# Patient Record
Sex: MALE | Race: WHITE | Employment: FULL TIME | URBAN - METROPOLITAN AREA
[De-identification: names, ages, dates, MRNs, and addresses within clinical notes are randomized per-mention and may not be internally consistent; named-entity substitution may affect disease eponyms.]

---

## 2017-01-02 ENCOUNTER — HOSPITAL ENCOUNTER (INPATIENT)
Facility: HOSPITAL | Age: 47
LOS: 2 days | Discharge: HOME | DRG: 419 | End: 2017-01-04
Attending: SURGERY | Admitting: SURGERY
Payer: COMMERCIAL

## 2017-01-02 ENCOUNTER — ANESTHESIA EVENT (OUTPATIENT)
Dept: SURGERY | Facility: HOSPITAL | Age: 47
DRG: 419 | End: 2017-01-02
Payer: COMMERCIAL

## 2017-01-02 PROBLEM — K80.00 CHOLECYSTITIS, ACUTE WITH CHOLELITHIASIS: Status: ACTIVE | Noted: 2017-01-02

## 2017-01-02 RX ORDER — TEMAZEPAM 15 MG/1
15 CAPSULE ORAL NIGHTLY PRN
Status: DISCONTINUED | OUTPATIENT
Start: 2017-01-02 | End: 2017-01-04

## 2017-01-02 RX ORDER — HEPARIN SODIUM 5000 [USP'U]/ML
5000 INJECTION, SOLUTION INTRAVENOUS; SUBCUTANEOUS
Status: COMPLETED | OUTPATIENT
Start: 2017-01-03 | End: 2017-01-03

## 2017-01-02 RX ORDER — SODIUM CHLORIDE, SODIUM LACTATE, POTASSIUM CHLORIDE, CALCIUM CHLORIDE 600; 310; 30; 20 MG/100ML; MG/100ML; MG/100ML; MG/100ML
INJECTION, SOLUTION INTRAVENOUS CONTINUOUS
Status: DISCONTINUED | OUTPATIENT
Start: 2017-01-02 | End: 2017-01-03

## 2017-01-02 RX ORDER — HYDROMORPHONE HYDROCHLORIDE 1 MG/ML
INJECTION, SOLUTION INTRAMUSCULAR; INTRAVENOUS; SUBCUTANEOUS
Status: COMPLETED
Start: 2017-01-02 | End: 2017-01-02

## 2017-01-02 RX ORDER — HYDROMORPHONE HYDROCHLORIDE 1 MG/ML
0.5 INJECTION, SOLUTION INTRAMUSCULAR; INTRAVENOUS; SUBCUTANEOUS EVERY 30 MIN PRN
Status: DISCONTINUED | OUTPATIENT
Start: 2017-01-02 | End: 2017-01-04

## 2017-01-02 RX ORDER — HEPARIN SODIUM 5000 [USP'U]/ML
5000 INJECTION, SOLUTION INTRAVENOUS; SUBCUTANEOUS ONCE
Status: DISCONTINUED | OUTPATIENT
Start: 2017-01-03 | End: 2017-01-02

## 2017-01-02 RX ORDER — METOCLOPRAMIDE HYDROCHLORIDE 5 MG/ML
10 INJECTION INTRAMUSCULAR; INTRAVENOUS EVERY 8 HOURS PRN
Status: DISCONTINUED | OUTPATIENT
Start: 2017-01-02 | End: 2017-01-04

## 2017-01-02 RX ORDER — HYDROMORPHONE HYDROCHLORIDE 1 MG/ML
0.3 INJECTION, SOLUTION INTRAMUSCULAR; INTRAVENOUS; SUBCUTANEOUS EVERY 30 MIN PRN
Status: DISCONTINUED | OUTPATIENT
Start: 2017-01-02 | End: 2017-01-04

## 2017-01-02 RX ORDER — HYDROCODONE BITARTRATE AND ACETAMINOPHEN 5; 325 MG/1; MG/1
1 TABLET ORAL EVERY 4 HOURS PRN
Status: DISCONTINUED | OUTPATIENT
Start: 2017-01-02 | End: 2017-01-02

## 2017-01-02 RX ORDER — HEPARIN SODIUM 5000 [USP'U]/ML
5000 INJECTION, SOLUTION INTRAVENOUS; SUBCUTANEOUS ONCE
Status: DISCONTINUED | OUTPATIENT
Start: 2017-01-02 | End: 2017-01-02

## 2017-01-02 RX ORDER — ACETAMINOPHEN 10 MG/ML
1000 INJECTION, SOLUTION INTRAVENOUS EVERY 8 HOURS PRN
Status: DISCONTINUED | OUTPATIENT
Start: 2017-01-02 | End: 2017-01-03

## 2017-01-02 RX ORDER — HYDROCODONE BITARTRATE AND ACETAMINOPHEN 5; 325 MG/1; MG/1
2 TABLET ORAL EVERY 4 HOURS PRN
Status: DISCONTINUED | OUTPATIENT
Start: 2017-01-02 | End: 2017-01-02

## 2017-01-02 RX ORDER — ONDANSETRON 2 MG/ML
4 INJECTION INTRAMUSCULAR; INTRAVENOUS EVERY 6 HOURS PRN
Status: DISCONTINUED | OUTPATIENT
Start: 2017-01-02 | End: 2017-01-04

## 2017-01-02 NOTE — OR NURSING
Sylvester Gottlieb states he was told to come in today to be admitted, thinks he is having surgery today as well. Spoke to the nursing supervisor who confirmed he is being admitted today. She is unsure when his surgery is to be done.

## 2017-01-02 NOTE — PROGRESS NOTES
[facility] Pharmacy Note:  Renal Adjustment for Unasyn (ampicillin/sulbactam)    Anne Jones is a 40year old male who has been prescribed Unasyn (ampicillin/sulbactam) 1.5 gm IVPB every 6 hrs.   Estimated CrCl is 87 ml/min based on IBW 75.3 kg and 12/31/16 SC

## 2017-01-03 ENCOUNTER — APPOINTMENT (OUTPATIENT)
Dept: GENERAL RADIOLOGY | Facility: HOSPITAL | Age: 47
DRG: 419 | End: 2017-01-03
Attending: SURGERY
Payer: COMMERCIAL

## 2017-01-03 ENCOUNTER — SURGERY (OUTPATIENT)
Age: 47
End: 2017-01-03

## 2017-01-03 ENCOUNTER — ANESTHESIA (OUTPATIENT)
Dept: SURGERY | Facility: HOSPITAL | Age: 47
DRG: 419 | End: 2017-01-03
Payer: COMMERCIAL

## 2017-01-03 LAB
ALBUMIN SERPL-MCNC: 2.9 G/DL (ref 3.5–4.8)
ALP LIVER SERPL-CCNC: 117 U/L (ref 45–117)
ALT SERPL-CCNC: 53 U/L (ref 17–63)
AST SERPL-CCNC: 24 U/L (ref 15–41)
BASOPHILS # BLD AUTO: 0.01 X10(3) UL (ref 0–0.1)
BASOPHILS NFR BLD AUTO: 0.1 %
BILIRUB SERPL-MCNC: 1.5 MG/DL (ref 0.1–2)
BUN BLD-MCNC: 10 MG/DL (ref 8–20)
CALCIUM BLD-MCNC: 8.3 MG/DL (ref 8.3–10.3)
CHLORIDE: 104 MMOL/L (ref 101–111)
CO2: 26 MMOL/L (ref 22–32)
CREAT BLD-MCNC: 1.11 MG/DL (ref 0.7–1.3)
EOSINOPHIL # BLD AUTO: 0 X10(3) UL (ref 0–0.3)
EOSINOPHIL NFR BLD AUTO: 0 %
ERYTHROCYTE [DISTWIDTH] IN BLOOD BY AUTOMATED COUNT: 13 % (ref 11.5–16)
GLUCOSE BLD-MCNC: 93 MG/DL (ref 70–99)
HCT VFR BLD AUTO: 40.7 % (ref 37–53)
HGB BLD-MCNC: 13.5 G/DL (ref 13–17)
IMMATURE GRANULOCYTE COUNT: 0.16 X10(3) UL (ref 0–1)
IMMATURE GRANULOCYTE RATIO %: 0.8 %
LYMPHOCYTES # BLD AUTO: 0.88 X10(3) UL (ref 0.9–4)
LYMPHOCYTES NFR BLD AUTO: 4.6 %
M PROTEIN MFR SERPL ELPH: 7 G/DL (ref 6.1–8.3)
MCH RBC QN AUTO: 30 PG (ref 27–33.2)
MCHC RBC AUTO-ENTMCNC: 33.2 G/DL (ref 31–37)
MCV RBC AUTO: 90.4 FL (ref 80–99)
MONOCYTES # BLD AUTO: 1.09 X10(3) UL (ref 0.1–0.6)
MONOCYTES NFR BLD AUTO: 5.7 %
NEUTROPHIL ABS PRELIM: 16.87 X10 (3) UL (ref 1.3–6.7)
NEUTROPHILS # BLD AUTO: 16.87 X10(3) UL (ref 1.3–6.7)
NEUTROPHILS NFR BLD AUTO: 88.8 %
PLATELET # BLD AUTO: 208 10(3)UL (ref 150–450)
POTASSIUM SERPL-SCNC: 3.6 MMOL/L (ref 3.6–5.1)
RBC # BLD AUTO: 4.5 X10(6)UL (ref 4.3–5.7)
RED CELL DISTRIBUTION WIDTH-SD: 43 FL (ref 35.1–46.3)
SODIUM SERPL-SCNC: 137 MMOL/L (ref 136–144)
WBC # BLD AUTO: 19 X10(3) UL (ref 4–13)

## 2017-01-03 PROCEDURE — 74300 X-RAY BILE DUCTS/PANCREAS: CPT

## 2017-01-03 PROCEDURE — 99223 1ST HOSP IP/OBS HIGH 75: CPT | Performed by: SURGERY

## 2017-01-03 PROCEDURE — BF101ZZ FLUOROSCOPY OF BILE DUCTS USING LOW OSMOLAR CONTRAST: ICD-10-PCS | Performed by: SURGERY

## 2017-01-03 PROCEDURE — 0FT44ZZ RESECTION OF GALLBLADDER, PERCUTANEOUS ENDOSCOPIC APPROACH: ICD-10-PCS | Performed by: SURGERY

## 2017-01-03 PROCEDURE — 47563 LAPARO CHOLECYSTECTOMY/GRAPH: CPT | Performed by: SURGERY

## 2017-01-03 DEVICE — SURGIFLO HEMOSTATIC MATRIX KIT: Type: IMPLANTABLE DEVICE | Site: ABDOMEN | Status: FUNCTIONAL

## 2017-01-03 RX ORDER — IBUPROFEN 600 MG/1
600 TABLET ORAL EVERY 6 HOURS PRN
Status: DISCONTINUED | OUTPATIENT
Start: 2017-01-03 | End: 2017-01-04

## 2017-01-03 RX ORDER — DEXAMETHASONE SODIUM PHOSPHATE 4 MG/ML
4 VIAL (ML) INJECTION AS NEEDED
Status: DISCONTINUED | OUTPATIENT
Start: 2017-01-03 | End: 2017-01-03 | Stop reason: HOSPADM

## 2017-01-03 RX ORDER — SODIUM CHLORIDE, SODIUM LACTATE, POTASSIUM CHLORIDE, CALCIUM CHLORIDE 600; 310; 30; 20 MG/100ML; MG/100ML; MG/100ML; MG/100ML
INJECTION, SOLUTION INTRAVENOUS CONTINUOUS
Status: DISCONTINUED | OUTPATIENT
Start: 2017-01-03 | End: 2017-01-04

## 2017-01-03 RX ORDER — ONDANSETRON 2 MG/ML
4 INJECTION INTRAMUSCULAR; INTRAVENOUS AS NEEDED
Status: DISCONTINUED | OUTPATIENT
Start: 2017-01-03 | End: 2017-01-03 | Stop reason: HOSPADM

## 2017-01-03 RX ORDER — HYDROCODONE BITARTRATE AND ACETAMINOPHEN 5; 325 MG/1; MG/1
1 TABLET ORAL EVERY 4 HOURS PRN
Status: DISCONTINUED | OUTPATIENT
Start: 2017-01-03 | End: 2017-01-04

## 2017-01-03 RX ORDER — DOCUSATE SODIUM 100 MG/1
100 CAPSULE, LIQUID FILLED ORAL 3 TIMES DAILY
Qty: 90 CAPSULE | Refills: 0 | Status: SHIPPED | OUTPATIENT
Start: 2017-01-03 | End: 2017-02-24

## 2017-01-03 RX ORDER — HYDROCODONE BITARTRATE AND ACETAMINOPHEN 5; 325 MG/1; MG/1
2 TABLET ORAL EVERY 4 HOURS PRN
Status: DISCONTINUED | OUTPATIENT
Start: 2017-01-03 | End: 2017-01-04

## 2017-01-03 RX ORDER — HYDROMORPHONE HYDROCHLORIDE 1 MG/ML
INJECTION, SOLUTION INTRAMUSCULAR; INTRAVENOUS; SUBCUTANEOUS
Status: COMPLETED
Start: 2017-01-03 | End: 2017-01-03

## 2017-01-03 RX ORDER — IBUPROFEN 400 MG/1
400 TABLET ORAL EVERY 6 HOURS PRN
Status: DISCONTINUED | OUTPATIENT
Start: 2017-01-03 | End: 2017-01-04

## 2017-01-03 RX ORDER — KETOROLAC TROMETHAMINE 30 MG/ML
30 INJECTION, SOLUTION INTRAMUSCULAR; INTRAVENOUS EVERY 6 HOURS PRN
Status: DISCONTINUED | OUTPATIENT
Start: 2017-01-03 | End: 2017-01-04

## 2017-01-03 RX ORDER — HEPARIN SODIUM 5000 [USP'U]/ML
5000 INJECTION, SOLUTION INTRAVENOUS; SUBCUTANEOUS EVERY 8 HOURS
Status: DISCONTINUED | OUTPATIENT
Start: 2017-01-03 | End: 2017-01-04

## 2017-01-03 RX ORDER — DEXTROSE AND SODIUM CHLORIDE 5; .45 G/100ML; G/100ML
INJECTION, SOLUTION INTRAVENOUS CONTINUOUS
Status: DISCONTINUED | OUTPATIENT
Start: 2017-01-03 | End: 2017-01-04

## 2017-01-03 RX ORDER — HYDROMORPHONE HYDROCHLORIDE 1 MG/ML
0.4 INJECTION, SOLUTION INTRAMUSCULAR; INTRAVENOUS; SUBCUTANEOUS EVERY 5 MIN PRN
Status: DISCONTINUED | OUTPATIENT
Start: 2017-01-03 | End: 2017-01-03 | Stop reason: HOSPADM

## 2017-01-03 RX ORDER — BUPIVACAINE HYDROCHLORIDE AND EPINEPHRINE 5; 5 MG/ML; UG/ML
INJECTION, SOLUTION EPIDURAL; INTRACAUDAL; PERINEURAL AS NEEDED
Status: DISCONTINUED | OUTPATIENT
Start: 2017-01-03 | End: 2017-01-03 | Stop reason: HOSPADM

## 2017-01-03 RX ORDER — KETOROLAC TROMETHAMINE 15 MG/ML
15 INJECTION, SOLUTION INTRAMUSCULAR; INTRAVENOUS EVERY 6 HOURS PRN
Status: DISCONTINUED | OUTPATIENT
Start: 2017-01-03 | End: 2017-01-04

## 2017-01-03 RX ORDER — HYDROCODONE BITARTRATE AND ACETAMINOPHEN 5; 325 MG/1; MG/1
TABLET ORAL
Qty: 30 TABLET | Refills: 1 | Status: SHIPPED | OUTPATIENT
Start: 2017-01-03 | End: 2017-01-19

## 2017-01-03 RX ORDER — NALOXONE HYDROCHLORIDE 0.4 MG/ML
80 INJECTION, SOLUTION INTRAMUSCULAR; INTRAVENOUS; SUBCUTANEOUS AS NEEDED
Status: DISCONTINUED | OUTPATIENT
Start: 2017-01-03 | End: 2017-01-03 | Stop reason: HOSPADM

## 2017-01-03 RX ORDER — DOCUSATE SODIUM 100 MG/1
100 CAPSULE, LIQUID FILLED ORAL 2 TIMES DAILY
Status: DISCONTINUED | OUTPATIENT
Start: 2017-01-03 | End: 2017-01-04

## 2017-01-03 RX ORDER — ONDANSETRON 2 MG/ML
INJECTION INTRAMUSCULAR; INTRAVENOUS
Status: COMPLETED
Start: 2017-01-03 | End: 2017-01-03

## 2017-01-03 RX ORDER — HEPARIN SODIUM 5000 [USP'U]/ML
5000 INJECTION, SOLUTION INTRAVENOUS; SUBCUTANEOUS ONCE
Status: CANCELLED | OUTPATIENT
Start: 2017-01-03 | End: 2017-01-03

## 2017-01-03 NOTE — PROGRESS NOTES
Pt's temp 102.4 around 2030, Dr. Pidead De Dios notified, order for IV acetaminophen received. Will implement and monitor.

## 2017-01-03 NOTE — BRIEF OP NOTE
[facility]  Brief Op Note     Melanie Castillo Location: OR   [facility] [id number] MRN [id number]   Admission Date 1/2/2017 Operation Date 1/3/2017   Attending Physician Moriah Chacon DO Operating Physician Adal Lea MD       Pre-Operative [place]

## 2017-01-03 NOTE — PLAN OF CARE
Spoke with Dr Cecilia Stewart at approx [phone] and informed him of pt pain at 10 on arrival to unit and temp 101.1.   Antibiotic clarification discussed and Dr Cecilia Stewart put in order for Unasyn 1.5Gm - pharmacy called and stated would change dose to 3 Gm to treat active in

## 2017-01-03 NOTE — PLAN OF CARE
GASTROINTESTINAL - ADULT    • Minimal or absence of nausea and vomiting Progressing        PAIN - ADULT    • Verbalizes/displays adequate comfort level or patient's stated pain goal Progressing        Patient/Family Goals    • Patient/Family Long Term Goal

## 2017-01-03 NOTE — H&P
[facility]  H&P    [place] Patient Status:  Inpatient    1970 MRN [id number]   [facility] 3NW-A Attending Breanna Wellington, DO   Hosp Day # 1 PCP Terri Moreno MD       History of Present Illness:  Oh Kate is a a ABSCESS  2010    INGUINAL HERNIA REPAIR  2007    Comment rt    [address]  6515, [phone]    Comment x2    COLONOSCOPY      LAP, SURG; COLECTOMY, PARTIAL, W/ANASTOMOSIS, W/COLOPROCTOSTOMY  2010    DRAINAGE OF PELVIC ABSCESS      UPPER GI END abnormal sleep patterns, increased activity, polydipsia and polyphagia  ENMT:  Negative for ear drainage, hearing loss and nasal drainage  Eyes:  Negative for eye discharge and vision loss  Gastrointestinal:  Positive for abdominal pain, nausea, vomiting N 01/03/17   0640   GLU  81  93   BUN  13  10   CREATSERUM  1.13  1.11   CA  8.3  8.3   ALB  3.6  2.9*   NA  141  137   K  3.5*  3.6   CL  105  104   CO2  29.0  26.0   ALKPHO  66  117   AST  18  24   ALT  30  53   BILT  0.6  1.5   TP  7.3  7.0         No res Normal.  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 ADRENALS:  Normal.  RETROPERITONEUM:  Normal.  BOWEL/MESENTERY:  Normal. No free intraperitoneal air, fluid or inflammatory changes.   ABDOMINAL WALL:  Normal. Time spent on counseling/coordination of care:  1 Hour    Total time spent with patient:  June Suttonfatimahankush 28  1/3/2017  9:40 AM      Addendum:    I have personally seen and examined this patient I have reviewed all data including CT scan imaging risks, benefits, and alternatives to the procedure were explained to the patient.   The risks explained include, but are not limited to, bleeding, infection, pain wound complications, recurrence, incorrect diagnosis, injury to adjacent organs and structures

## 2017-01-03 NOTE — ANESTHESIA POSTPROCEDURE EVALUATION
[facility] Patient Status:  Inpatient   Age/Gender 40year old male MRN [id number]   [facility] Day # 1 PCP Luis E Larsen MD       Anesthesia Post-op Note    Procedure(s):

## 2017-01-03 NOTE — PROGRESS NOTES
[facility] Pharmacy Note:  Acetaminophen Therapeutic Duplication      Hugo Del Angel is a(n) 40year old male who has been prescribed both IV acetaminophen (Ofirmev) and hydrocodone-acetaminophen (Norco).  Hydrocodone-acetaminophen (Norco) was discontinued per P&

## 2017-01-03 NOTE — PLAN OF CARE
Problem: PAIN - ADULT  Goal: Verbalizes/displays adequate comfort level or patient’s stated pain goal  INTERVENTIONS:  - Encourage pt to monitor pain and request assistance  - Assess pain using appropriate pain scale  - Administer analgesics based on type

## 2017-01-03 NOTE — PROGRESS NOTES
RECEIVED FROM PACU PER BED, O2 ON AT 3 LITERS AND SATS >90, DENIES ANY RESPIRATORY DIFFICULTY, ABD SOFT, DENIES NAUSEA, LAP SITES X4 NOTED WITH STERITAPES INTACT, DENIES ANY BLADDER FULLNESS, STATES PAIN IS TOLERABLE, DROWSY BUT AROUSES EASILY

## 2017-01-03 NOTE — ANESTHESIA PREPROCEDURE EVALUATION
PRE-OP EVALUATION    Patient Name: Brian Lynch    Pre-op Diagnosis: Cholecystitis [K81.9]    Procedure(s):  Laparoscopic Cholecystectomy with Intraoperative Cholangiogram    Surgeon(s) and Role:     * Vicki Padron MD - Primary    Pre-op vitals re Intravenous Q8H PRN       Outpatient Medications:    No outpatient prescriptions have been marked as taking for the 1/2/17 encounter [facility] Encounter). Allergies: Review of patient's allergies indicates no known allergies.       Anesthesia Evaluation 01/03/2017   MCV 90.4 01/03/2017   MCH 30.0 01/03/2017   MCHC 33.2 01/03/2017   RDW 13.0 01/03/2017   .0 01/03/2017       Lab Results  Component Value Date    01/03/2017   K 3.6 01/03/2017    01/03/2017   CO2 26.0 01/03/2017   BUN 10 01/

## 2017-01-03 NOTE — OPERATIVE REPORT
OPERATIVE REPORT   PREOPERATIVE DIAGNOSIS: Acute Cholecystitis and cholelithiasis.    POSTOPERATIVE DIAGNOSIS: Acute Cholecystitis and cholelithiasis with obstruction   PROCEDURE PERFORMED: Laparoscopic cholecystectomy with intraoperative cholangiogram.   A NOTE[de-identified] A surgical assistant was absolutely essential to the proper conduct of this case, particularly in the performance of the cholangiogram, as well as the careful dissection necessary in and around the hilum of the gallbladder.    DESCRIPTION OF PROCED patient's side of the cystic duct, which was divided by Endoshears. Next, one clip was placed on the cystic artery on the specimen side, three towards the patient side, and the cystic artery was divided with the Endoshears.  Next, the gallbladder was elevat

## 2017-01-04 VITALS
TEMPERATURE: 98 F | SYSTOLIC BLOOD PRESSURE: 110 MMHG | DIASTOLIC BLOOD PRESSURE: 71 MMHG | HEART RATE: 83 BPM | OXYGEN SATURATION: 96 % | RESPIRATION RATE: 18 BRPM

## 2017-01-04 NOTE — PROGRESS NOTES
NURSING DISCHARGE NOTE    Discharged Home via Ambulatory. Accompanied by Support staff  Belongings Taken by patient/family. PT. DISCHARGED HOME IN STABLE CONDITION. IV REMOVED. IV CATHETER TIP INTACT. GAUZE AND BAND-AID APPLIED.  DISCHARGE INSTRUCTION

## 2017-01-04 NOTE — PROGRESS NOTES
Pt's IV infiltrated this am. Dr. Mcdaniels Shallow notified of scheduled IV antibiotics, pt saline locked otherwise and plans for discharge. Order to continue saline lock pt until seen on am rounds. Will implement.

## 2017-01-04 NOTE — PROGRESS NOTES
DISCUSSED PLAN OF CARE AND AM MEDICATIONS WITH PT. DISCUSSED DIET OF SOFT, TOLERATING WELL, NO NAUSEA, PASSING FLATUS, MILD ABD DISTENSION NOTED. DISCUSSED ACTIVITY LEVEL, ENC AMBULATION IN ANDERSON WITH ASSIST AS NEEDED, UP TO CHAIR, UP AND ABOUT IN ROOM.  DIS

## 2017-01-04 NOTE — PROGRESS NOTES
O2 REMOVED MID AFTERNOON AND SATS MAINTAIN >90 ON ROOM, AIR, ABD SOFT, TAKES LIQUIDS WITHOUT NAUSEA AND DIET ADVANCED TO SOFT FOR DINNER, LAP SITES REMAIN CLEAN AND DRY, UP WALKING IN ROOM AND ANDERSON WITH STEADY GAIT, DILAUDID GIVEN FOR DISCOMFORT LATER THIS

## 2017-01-04 NOTE — PAYOR COMM NOTE
Attending Physician: Loreta Gee DO    Review Type: ADMISSION   Reviewer: Nadia Godoy       Date: January 4, 2017 - 11:00 AM  Payor: LIZ UREÑA  Authorization Number: [id number]  Admit date: 1/2/2017  3:52 PM       History of Present Illness:  Mari Gibbs chest /upper abdominal pain with cough for 2 days.         100 cc of Omnipaque 350                  FINDINGS:     VASCULATURE:  Normal. No evidence of pulmonary embolism. THORACIC AORTA:  Normal.  LUNGS:  Normal. The lungs are clear.   MEDIASTINUM/MARGIE:  N chloride 0.9 % 100 mL IVPB     Date Action Dose Route User    1/4/2017 1030 New Bag 3 g Intravenous Ann MEDRANO RN      dextrose 5 %-0.45 % NaCl infusion 100 ml/hr     Date Action Dose Route User    1/3/2017 1539 New Bag (none) Intravenous Oscar Haywood Dose Route User    1/3/2017 1235 New Bag (none) Intravenous Jessica Addison, RN      ondansetron HCl [facility]) injection 4 mg     Date Action Dose Route User    1/3/2017 1222 Given 4 mg Intravenous Jessica Addison, RN          RESULTS LAST 24HRS:  Labs Review

## 2017-01-04 NOTE — DISCHARGE SUMMARY
[facility]  Discharge Summary    Zita Marrufo Patient Status:  Inpatient    1970 MRN [id number]   [facility] 3NW-A Attending Valentin Vo DO   Hosp Day # 2 PCP Alexander Lopez DO     Date of Admission: 2017    Date of Disc Tab  Take 1 or 2 tablets every 4  To 6 hours for pain  Qty: 30 tablet Refills: 1    docusate sodium (COLACE) 100 MG Oral Cap  Take 1 capsule (100 mg total) by mouth 3 (three) times daily.   Qty: 90 capsule Refills: 0    !! - Potential duplicate medications

## 2017-01-04 NOTE — PROGRESS NOTES
DR. Sj Yen HERE ON UNIT. GAVE ORDER TO DISCHARGE HOME, F/U IN ONE WEEK, OK TO SHOWER, HOME WITH Lynne Matos, NO ABX NEEDED AT HOME. WILL PREPARE FOR D/C HOME.

## 2017-01-04 NOTE — PROGRESS NOTES
[facility]  Progress Note    Martita Jewell Patient Status:  Inpatient    1970 MRN [id number]   [facility] 3NW-A Attending Stephanie Essex, DO   Hosp Day # 2 PCP Karolina Vidal DO     Subjective:  No new complaints, expected incisi with all dressings off. Leave white bandages (steri-strips) in place. They will fall off in 7-10 days. Follow up in 7-10 days  Norco as needed for pain  Resume all home medications. General diet    My total face time with this patient was 20 minutes.   Gr

## 2017-01-05 NOTE — PAYOR COMM NOTE
Review Type: CONTINUED STAY  Reviewer: Mango Brewster     Date: January 5, 2017 - 11:12 AM  Payor: LIZ PPGERI  Authorization Number: [id number]  Admit date: 1/2/2017  3:52 PM        Date of Discharge: 1/4/2017    Admitting Diagnosis: Cholecystitis [K81.9]

## 2017-01-11 NOTE — PROGRESS NOTES
Madison Champion is a 40year old male who presents for HFU.   HPI:   Pt s/p laparoscopic cholecystectomy. Pt was discharged 1/4/17. Pt denies fever  Off norco  No constipation    Pt admits to feeling sad.   He is worried about how long he will live and History    LAPAROSCOPIC APPENDECTOMY  2010    DRAINAGE OF PELVIC ABSCESS  2010    INGUINAL HERNIA REPAIR  2007    Comment rt    [address]  2971, [address]397    Comment x2    COLONOSCOPY      LAP, SURG; COLECTOMY, PARTIAL, W/ANASTOMOSIS, W/COLO clear  NECK: supple,no adenopathy,no bruits  CHEST: no chest tenderness  LUNGS: clear to auscultation  CARDIO: RRR without murmur  GI: good BS's,no masses, HSM or tenderness, lateral side of umbilicus incision oozing , no s/s of infection   EXTREMITIES: no

## 2017-01-19 ENCOUNTER — OFFICE VISIT (OUTPATIENT)
Dept: SURGERY | Facility: CLINIC | Age: 47
End: 2017-01-19

## 2017-01-19 VITALS
BODY MASS INDEX: 25.06 KG/M2 | SYSTOLIC BLOOD PRESSURE: 112 MMHG | TEMPERATURE: 98 F | RESPIRATION RATE: 18 BRPM | HEART RATE: 87 BPM | DIASTOLIC BLOOD PRESSURE: 74 MMHG | WEIGHT: 179 LBS | HEIGHT: 71 IN

## 2017-01-19 DIAGNOSIS — K80.00 CALCULUS OF GALLBLADDER WITH ACUTE CHOLECYSTITIS WITHOUT OBSTRUCTION: Primary | ICD-10-CM

## 2017-01-19 PROCEDURE — 99024 POSTOP FOLLOW-UP VISIT: CPT | Performed by: SURGERY

## 2017-01-19 NOTE — PROGRESS NOTES
Post Operative Visit Note       Active Problems  1.  Calculus of gallbladder with acute cholecystitis without obstruction         Chief Complaint   Post-Op     History of Present Illness     The patient presents for postoperative care following laparoscopic Fissure, anal 2011   • Perforated diverticulitis 6/2010     w/abscess, recurrent- s/p low anterior resection         Past Surgical History    LAPAROSCOPIC APPENDECTOMY  2010    DRAINAGE OF PELVIC ABSCESS  2010    INGUINAL HERNIA REPAIR  2007    Comment rt Constitutional: Negative for fever, chills, diaphoresis, fatigue and unexpected weight change. HENT: Negative for hearing loss, nosebleeds, sore throat and trouble swallowing. Respiratory: Negative for apnea, cough, shortness of breath and wheezing. opportunity to ask questions. The patient's questions were answered in detail and to the patient's satisfaction. The patient voiced understanding of the postoperative care plan.   ·   · The patient's previously scheduled surgery for perianal skin tag has

## 2017-02-20 ENCOUNTER — TELEPHONE (OUTPATIENT)
Dept: SURGERY | Facility: CLINIC | Age: 47
End: 2017-02-20

## 2017-02-24 ENCOUNTER — TELEPHONE (OUTPATIENT)
Dept: SURGERY | Facility: CLINIC | Age: 47
End: 2017-02-24

## 2017-02-24 RX ORDER — DOCUSATE SODIUM 100 MG/1
100 CAPSULE, LIQUID FILLED ORAL 3 TIMES DAILY
Qty: 90 CAPSULE | Refills: 0 | Status: SHIPPED | OUTPATIENT
Start: 2017-02-24 | End: 2020-06-25

## 2017-02-24 NOTE — TELEPHONE ENCOUNTER
Pt aware. Appt changed to 2/28/17.  mp   Future Appointments  Date Time Provider Chaitanya Addie   2/28/2017 9:30 AM Timbo Padron MD [facility] General

## 2017-02-24 NOTE — TELEPHONE ENCOUNTER
Pt called. Pt had lap humberto 1/2/17. Pt has upcoming appt next week. Pt states 'is having pain at the surgical site. Is in a lot of pain. Would like to know next step?' Pt denies fever/nausea/vomiting. Per Manny La, pt can take Aleve for pain.  Pt requesting

## 2017-02-28 ENCOUNTER — OFFICE VISIT (OUTPATIENT)
Dept: SURGERY | Facility: CLINIC | Age: 47
End: 2017-02-28

## 2017-02-28 VITALS — HEIGHT: 71 IN | WEIGHT: 182 LBS | BODY MASS INDEX: 25.48 KG/M2

## 2017-02-28 DIAGNOSIS — R10.9 RIGHT FLANK PAIN: Primary | ICD-10-CM

## 2017-02-28 PROCEDURE — 99212 OFFICE O/P EST SF 10 MIN: CPT | Performed by: SURGERY

## 2017-02-28 NOTE — PROGRESS NOTES
Follow Up Visit Note       Active Problems      1. Right flank pain          Chief Complaint   Patient presents with:  Gallbladder: pt here for 1 month follow up Calculus of gallbladder with acute cholecystitis without obstruction.  some days has no pain 2 site 6/29/2012   • Unspecified disorder of skin and subcutaneous tissue 6/29/2012   • Sebaceous cyst 6/29/2012   • Plantar fascial fibromatosis 6/29/2012   • Nasal polyp - anterior 4/29/2013   • Allergic rhinitis 4/29/2013   • PND (paroxysmal nocturnal dys total) by mouth 3 (three) times daily. Disp: 90 capsule Rfl: 0   Hydrocortisone Acetate (ANUSOL-HC) 25 MG Rectal Suppos Place 1 suppository (25 mg total) rectally daily.  Disp: 14 suppository Rfl: 0        Review of Systems  The Review of Systems has been r will be based on the ultrasound findings. · The patient was provided ample opportunity to ask questions. · All of the patient's questions were answered in detail. · The patient voiced understanding of the care plan.        No orders of the defined types

## 2017-03-07 ENCOUNTER — HOSPITAL ENCOUNTER (OUTPATIENT)
Dept: ULTRASOUND IMAGING | Age: 47
Discharge: HOME | End: 2017-03-07
Attending: SURGERY
Payer: COMMERCIAL

## 2017-03-07 DIAGNOSIS — R10.9 RIGHT FLANK PAIN: ICD-10-CM

## 2017-03-07 PROCEDURE — 76700 US EXAM ABDOM COMPLETE: CPT

## 2017-03-10 ENCOUNTER — LABORATORY ENCOUNTER (OUTPATIENT)
Dept: LAB | Age: 47
End: 2017-03-10
Attending: SURGERY
Payer: COMMERCIAL

## 2017-03-10 DIAGNOSIS — K64.4 ANAL SKIN TAG: Primary | ICD-10-CM

## 2017-03-10 PROCEDURE — 88304 TISSUE EXAM BY PATHOLOGIST: CPT

## 2017-04-13 ENCOUNTER — OFFICE VISIT (OUTPATIENT)
Dept: SURGERY | Facility: CLINIC | Age: 47
End: 2017-04-13

## 2017-04-13 VITALS
BODY MASS INDEX: 25 KG/M2 | WEIGHT: 182 LBS | TEMPERATURE: 99 F | HEART RATE: 71 BPM | SYSTOLIC BLOOD PRESSURE: 113 MMHG | DIASTOLIC BLOOD PRESSURE: 74 MMHG

## 2017-04-13 DIAGNOSIS — K60.2 ANAL FISSURE: ICD-10-CM

## 2017-04-13 DIAGNOSIS — L29.0 PRURITUS ANI: Primary | ICD-10-CM

## 2017-04-13 PROCEDURE — 99213 OFFICE O/P EST LOW 20 MIN: CPT | Performed by: SURGERY

## 2017-04-13 NOTE — PROGRESS NOTES
Post Operative Visit Note       Active Problems  1. Pruritus ani    2. Anal fissure         Chief Complaint   Patient presents with:  Hemorrhoids: 3/10 hemorrhoidectomy - last 10 days very bad, constant itching, burning and pain.  Using Prep H wipes and jose polyp - anterior 4/29/2013   • Allergic rhinitis 4/29/2013   • PND (paroxysmal nocturnal dyspnea) 8/7/2013   • Testicular pain, left 2010   • Fissure, anal 2011   • Perforated diverticulitis 6/2010     w/abscess, recurrent- s/p low anterior resection (three) times daily. Disp: 90 capsule Rfl: 0   Hydrocortisone Acetate (ANUSOL-HC) 25 MG Rectal Suppos Place 1 suppository (25 mg total) rectally daily.  Disp: 14 suppository Rfl: 0         Review of Systems  The Review of Systems has been reviewed by me dur shows no external hemorrhoid, no internal hemorrhoid, no mass, no tenderness and anal tone normal. Prostate is not enlarged and not tender.          No Prostate Nodule  Anal Sphincter Intact  No Lichenification  No Abscess  No Fistula in ano  No Anterior Fi

## 2017-04-27 ENCOUNTER — OFFICE VISIT (OUTPATIENT)
Dept: SURGERY | Facility: CLINIC | Age: 47
End: 2017-04-27

## 2017-04-27 VITALS — HEIGHT: 71 IN | WEIGHT: 182 LBS | BODY MASS INDEX: 25.48 KG/M2 | RESPIRATION RATE: 18 BRPM

## 2017-04-27 DIAGNOSIS — L29.0 PRURITUS ANI: Primary | ICD-10-CM

## 2017-04-27 PROCEDURE — 99212 OFFICE O/P EST SF 10 MIN: CPT | Performed by: SURGERY

## 2017-04-27 NOTE — PROGRESS NOTES
Follow Up Visit Note       Active Problems      1. Pruritus ani          Chief Complaint   Patient presents with:  Anal Problem (GI): pt here for 2 week follow up Pruritus ani. analpram has helped a lot, using 3 times daily. no constipation or diarrhea.  no Past Surgical History    LAPAROSCOPIC APPENDECTOMY  2010    DRAINAGE OF PELVIC ABSCESS  2010    INGUINAL HERNIA REPAIR  2007    Comment rt    [address] [facility] PILONIDAL LESION COMPLIC  7926, 4291    Comment x2    COLONOSCOPY      LAP, SURG; COLECTOMY, PARTIAL, during today. Review of Systems   Constitutional: Negative for fever, chills, diaphoresis, fatigue and unexpected weight change. HENT: Negative for hearing loss, nosebleeds, sore throat and trouble swallowing.     Respiratory: Negative for apnea, cough,

## 2017-05-03 ENCOUNTER — OFFICE VISIT (OUTPATIENT)
Dept: FAMILY MEDICINE CLINIC | Facility: CLINIC | Age: 47
End: 2017-05-03

## 2017-05-03 VITALS
DIASTOLIC BLOOD PRESSURE: 72 MMHG | OXYGEN SATURATION: 98 % | RESPIRATION RATE: 20 BRPM | BODY MASS INDEX: 27 KG/M2 | SYSTOLIC BLOOD PRESSURE: 106 MMHG | WEIGHT: 197 LBS | HEART RATE: 71 BPM | TEMPERATURE: 98 F

## 2017-05-03 DIAGNOSIS — D17.0 LIPOMA OF FACE: Primary | ICD-10-CM

## 2017-05-03 PROCEDURE — 99213 OFFICE O/P EST LOW 20 MIN: CPT | Performed by: FAMILY MEDICINE

## 2020-06-25 ENCOUNTER — LAB ENCOUNTER (OUTPATIENT)
Dept: LAB | Age: 50
End: 2020-06-25
Attending: PHYSICIAN ASSISTANT
Payer: COMMERCIAL

## 2020-06-25 ENCOUNTER — OFFICE VISIT (OUTPATIENT)
Dept: FAMILY MEDICINE CLINIC | Facility: CLINIC | Age: 50
End: 2020-06-25
Payer: COMMERCIAL

## 2020-06-25 VITALS
HEIGHT: 71 IN | WEIGHT: 213 LBS | HEART RATE: 66 BPM | RESPIRATION RATE: 16 BRPM | SYSTOLIC BLOOD PRESSURE: 114 MMHG | DIASTOLIC BLOOD PRESSURE: 76 MMHG | BODY MASS INDEX: 29.82 KG/M2 | OXYGEN SATURATION: 98 %

## 2020-06-25 DIAGNOSIS — Z12.5 PROSTATE CANCER SCREENING: ICD-10-CM

## 2020-06-25 DIAGNOSIS — E55.9 VITAMIN D DEFICIENCY: ICD-10-CM

## 2020-06-25 DIAGNOSIS — R07.89 SQUEEZING CHEST PAIN: ICD-10-CM

## 2020-06-25 DIAGNOSIS — Z00.00 ROUTINE GENERAL MEDICAL EXAMINATION AT A HEALTH CARE FACILITY: Primary | ICD-10-CM

## 2020-06-25 DIAGNOSIS — Z00.00 ROUTINE GENERAL MEDICAL EXAMINATION AT A HEALTH CARE FACILITY: ICD-10-CM

## 2020-06-25 PROBLEM — K80.00 CHOLECYSTITIS, ACUTE WITH CHOLELITHIASIS: Status: RESOLVED | Noted: 2017-01-02 | Resolved: 2020-06-25

## 2020-06-25 PROBLEM — L29.0 PRURITUS ANI: Status: RESOLVED | Noted: 2017-04-13 | Resolved: 2020-06-25

## 2020-06-25 PROBLEM — R10.9 RIGHT FLANK PAIN: Status: RESOLVED | Noted: 2017-02-28 | Resolved: 2020-06-25

## 2020-06-25 PROBLEM — K60.2 ANAL FISSURE: Status: RESOLVED | Noted: 2017-04-13 | Resolved: 2020-06-25

## 2020-06-25 PROCEDURE — 82306 VITAMIN D 25 HYDROXY: CPT | Performed by: PHYSICIAN ASSISTANT

## 2020-06-25 PROCEDURE — 80050 GENERAL HEALTH PANEL: CPT | Performed by: PHYSICIAN ASSISTANT

## 2020-06-25 PROCEDURE — 84153 ASSAY OF PSA TOTAL: CPT | Performed by: PHYSICIAN ASSISTANT

## 2020-06-25 PROCEDURE — 80061 LIPID PANEL: CPT | Performed by: PHYSICIAN ASSISTANT

## 2020-06-25 PROCEDURE — 99396 PREV VISIT EST AGE 40-64: CPT | Performed by: PHYSICIAN ASSISTANT

## 2020-06-25 PROCEDURE — 82607 VITAMIN B-12: CPT | Performed by: PHYSICIAN ASSISTANT

## 2020-06-25 PROCEDURE — 36415 COLL VENOUS BLD VENIPUNCTURE: CPT | Performed by: PHYSICIAN ASSISTANT

## 2020-06-25 NOTE — PROGRESS NOTES
HPI:    Patient ID: Vincenzo Sagastume is a 53year old male. HPI   Patient presents today requesting physical exam. Overall feeling well. Has concerns today about a squeezing sensation he experienced in the left chest 2 days ago.   It happened while he was note and vitals reviewed. Constitutional: He is oriented to person, place, and time. He appears well-developed and well-nourished. No distress. HENT:   Head: Normocephalic and atraumatic.    Right Ear: Tympanic membrane and external ear normal.   Left E recurred. He is convinced it was secondary to dehydration. EKG in office shows sinus bradycardia but otherwise unremarkable. He is not interested in subsequent testing at this time.   Advised to monitor symptoms closely and contact the office if anything

## 2020-07-29 ENCOUNTER — PATIENT MESSAGE (OUTPATIENT)
Dept: FAMILY MEDICINE CLINIC | Facility: CLINIC | Age: 50
End: 2020-07-29

## 2020-07-29 RX ORDER — LEVOCETIRIZINE DIHYDROCHLORIDE 5 MG/1
5 TABLET, FILM COATED ORAL EVERY EVENING
Qty: 30 TABLET | Refills: 2 | Status: SHIPPED | OUTPATIENT
Start: 2020-07-29 | End: 2021-02-08 | Stop reason: ALTCHOICE

## 2020-07-29 RX ORDER — FLUTICASONE PROPIONATE 50 MCG
2 SPRAY, SUSPENSION (ML) NASAL DAILY
Qty: 1 BOTTLE | Refills: 3 | Status: SHIPPED | OUTPATIENT
Start: 2020-07-29

## 2020-07-29 NOTE — TELEPHONE ENCOUNTER
From: Hugo Del Angel  To: Veda Brown PA-C  Sent: 7/29/2020 8:15 AM CDT  Subject: Visit Follow-up Question    Dc Thomas  On my last clinic visit about two weeks ago, you mentioned to me that you feel that I might have a little buildup of saliva, muc

## 2020-07-29 NOTE — TELEPHONE ENCOUNTER
Last OV 6/25/2020. Justice Diallo PA-C, patient requesting medication for post nasal drip/mucous in throat.

## 2020-10-20 ENCOUNTER — TELEPHONE (OUTPATIENT)
Dept: FAMILY MEDICINE CLINIC | Facility: CLINIC | Age: 50
End: 2020-10-20

## 2020-10-21 NOTE — TELEPHONE ENCOUNTER
I have only seen this patient for a lipoma in 2017. He had a physical with Ratna Bowman in June 2020. He signed up is a patient for Dr. Era Gonzalez. In reviewing his problem list I cannot find a reason why he would get a pneumonia vaccine at age 53. What is the indication for pneumonia vaccine?

## 2020-10-29 ENCOUNTER — NURSE ONLY (OUTPATIENT)
Dept: FAMILY MEDICINE CLINIC | Facility: CLINIC | Age: 50
End: 2020-10-29
Payer: COMMERCIAL

## 2020-10-29 PROCEDURE — 90471 IMMUNIZATION ADMIN: CPT | Performed by: FAMILY MEDICINE

## 2020-10-29 PROCEDURE — 90686 IIV4 VACC NO PRSV 0.5 ML IM: CPT | Performed by: FAMILY MEDICINE

## 2020-10-31 ENCOUNTER — OFFICE VISIT (OUTPATIENT)
Dept: FAMILY MEDICINE CLINIC | Facility: CLINIC | Age: 50
End: 2020-10-31
Payer: COMMERCIAL

## 2020-10-31 VITALS
WEIGHT: 205 LBS | TEMPERATURE: 98 F | OXYGEN SATURATION: 98 % | DIASTOLIC BLOOD PRESSURE: 74 MMHG | BODY MASS INDEX: 28.7 KG/M2 | SYSTOLIC BLOOD PRESSURE: 110 MMHG | RESPIRATION RATE: 16 BRPM | HEIGHT: 71 IN | HEART RATE: 77 BPM

## 2020-10-31 DIAGNOSIS — Z20.822 EXPOSURE TO COVID-19 VIRUS: Primary | ICD-10-CM

## 2020-10-31 PROCEDURE — 99213 OFFICE O/P EST LOW 20 MIN: CPT | Performed by: NURSE PRACTITIONER

## 2020-10-31 PROCEDURE — 3074F SYST BP LT 130 MM HG: CPT | Performed by: NURSE PRACTITIONER

## 2020-10-31 PROCEDURE — 3008F BODY MASS INDEX DOCD: CPT | Performed by: NURSE PRACTITIONER

## 2020-10-31 PROCEDURE — 3078F DIAST BP <80 MM HG: CPT | Performed by: NURSE PRACTITIONER

## 2020-10-31 PROCEDURE — U0003 INFECTIOUS AGENT DETECTION BY NUCLEIC ACID (DNA OR RNA); SEVERE ACUTE RESPIRATORY SYNDROME CORONAVIRUS 2 (SARS-COV-2) (CORONAVIRUS DISEASE [COVID-19]), AMPLIFIED PROBE TECHNIQUE, MAKING USE OF HIGH THROUGHPUT TECHNOLOGIES AS DESCRIBED BY CMS-2020-01-R: HCPCS | Performed by: NURSE PRACTITIONER

## 2020-10-31 NOTE — PATIENT INSTRUCTIONS
Coronavirus Disease 2019 (COVID-19)     [facility] is committed to the safety and well-being of our patients, members, employees, and communities.  As concerns arise about the new strain of coronavirus that causes COVID-19, [facility] 4. If you have a medical appointment, call the healthcare provider ahead of time and tell them that you have or may have COVID-19.  5. For medical emergencies, call 911 and notify the dispatch personnel that you have or may have COVID-19.   6. Cover your c · At least 10 days have passed since symptoms first appeared OR if asymptomatic patient or date of symptom onset is unclear then use 10 days post COVID test date.    · At least 20 days have passed for severe illness (requiring hospitalization) OR if you are *Some people will be required to have a repeat COVID-19 test in order to be eligible to donate. If you’re instructed by Elma Amin that a repeat test is required, please contact the [facility] COVID-19 Nurse Triage Line at [phone].     Additional Inf

## 2020-10-31 NOTE — PROGRESS NOTES
CHIEF COMPLAINT:   Patient presents with:  Covid: exposed 5 days prior. no symptoms      HPI:   Martita Jewell is a 53year old male who presents for Covid 19 exposure 12 days ago. Reports no symptoms. Requesting covid testing.   At this time, not exper • [address] [place] [address] Road  [phone], [phone]    x2   • UPPER GI ENDOSCOPY,EXAM  2006         Social History    Tobacco Use      Smoking status: Current Every Day Smoker        Types: Cigars      Smokeless tobacco: Current User      Tobacco comment: 1 cigar Coronavirus Disease 2019 (COVID-19)     [facility] is committed to the safety and well-being of our patients, members, employees, and communities.  As concerns arise about the new strain of coronavirus that causes COVID-19, [facility] 4. If you have a medical appointment, call the healthcare provider ahead of time and tell them that you have or may have COVID-19.  5. For medical emergencies, call 911 and notify the dispatch personnel that you have or may have COVID-19.   6. Cover your c · At least 10 days have passed since symptoms first appeared OR if asymptomatic patient or date of symptom onset is unclear then use 10 days post COVID test date.    · At least 20 days have passed for severe illness (requiring hospitalization) OR if you are *Some people will be required to have a repeat COVID-19 test in order to be eligible to donate. If you’re instructed by Adilene Nelson that a repeat test is required, please contact the [facility] COVID-19 Nurse Triage Line at [phone].     Additional Inf

## 2020-11-13 ENCOUNTER — OFFICE VISIT (OUTPATIENT)
Dept: SURGERY | Facility: CLINIC | Age: 50
End: 2020-11-13
Payer: COMMERCIAL

## 2020-11-13 VITALS
BODY MASS INDEX: 29 KG/M2 | SYSTOLIC BLOOD PRESSURE: 106 MMHG | WEIGHT: 207 LBS | DIASTOLIC BLOOD PRESSURE: 76 MMHG | TEMPERATURE: 97 F | RESPIRATION RATE: 16 BRPM | HEART RATE: 70 BPM

## 2020-11-13 DIAGNOSIS — L29.0 PRURITUS ANI: ICD-10-CM

## 2020-11-13 DIAGNOSIS — K62.89 RECTAL PAIN: ICD-10-CM

## 2020-11-13 DIAGNOSIS — K62.9 PERIANAL LESION: ICD-10-CM

## 2020-11-13 DIAGNOSIS — K64.8 BLEEDING INTERNAL HEMORRHOIDS: Primary | ICD-10-CM

## 2020-11-13 PROCEDURE — 99072 ADDL SUPL MATRL&STAF TM PHE: CPT | Performed by: SURGERY

## 2020-11-13 PROCEDURE — 3074F SYST BP LT 130 MM HG: CPT | Performed by: SURGERY

## 2020-11-13 PROCEDURE — 46600 DIAGNOSTIC ANOSCOPY SPX: CPT | Performed by: SURGERY

## 2020-11-13 PROCEDURE — 3078F DIAST BP <80 MM HG: CPT | Performed by: SURGERY

## 2020-11-13 PROCEDURE — 99244 OFF/OP CNSLTJ NEW/EST MOD 40: CPT | Performed by: SURGERY

## 2020-11-13 RX ORDER — HYDROCORTISONE ACETATE PRAMOXINE HCL 2.5; 1 G/100G; G/100G
1 CREAM TOPICAL 3 TIMES DAILY PRN
Qty: 1 TUBE | Refills: 1 | Status: SHIPPED | OUTPATIENT
Start: 2020-11-13

## 2020-11-13 NOTE — PATIENT INSTRUCTIONS
Anal Itching (Pruritis Ani)  The anus is the opening where bowel movements leave the body. The skin around the anus can easily become irritated and inflamed. You may feel burning, soreness, and intense itching. This can make you want to scratch the area. · Use unscented, soft toilet paper. · Avoid skin irritants in the anal area. These include soap, bubble baths, genital deodorants, and scented wipes. · Wear loose-fitting underwear made of cotton. Avoid pantyhose and tight pants.  Change underwear every d

## 2020-11-13 NOTE — PROGRESS NOTES
Follow Up Visit Note       Active Problems      1. Bleeding internal hemorrhoids    2. Perianal lesion    3. Rectal pain    4. Pruritus ani          Chief Complaint   Patient presents with:  Anal Problem: est pt.  Pt states 'is having itching and burning ar Fissure, anal 2011   • Nasal polyp - anterior 4/29/2013   • Other joint derangement, not elsewhere classified, lower leg    • Pain in limb    • Perforated diverticulitis 6/2010    w/abscess, recurrent- s/p low anterior resection   • Pilonidal cyst with abs Medications:   •  Hydrocort-Pramoxine, Perianal, (ANALPRAM HC) 2.5-1 % External Cream, Apply 1 Application topically 3 (three) times daily as needed (RECTAL PAIN).  Apply to affected area 3-4 times daily, Disp: 1 Tube, Rfl: 1  •  Levocetirizine Dihydrochlor heard.  Pulmonary/Chest: No accessory muscle usage. No tachypnea. No respiratory distress. He has no decreased breath sounds. He has no wheezes. He has no rhonchi. Genitourinary:    Prostate normal.   Rectum:      Internal hemorrhoid present.       No rec understanding, and after all questions were answered to the patient's satisfaction, the patient provided willing and informed consent to proceed. No orders of the defined types were placed in this encounter.       Imaging & Referrals   None    Follow Up

## 2020-12-04 PROCEDURE — 88305 TISSUE EXAM BY PATHOLOGIST: CPT | Performed by: SURGERY

## 2020-12-04 PROCEDURE — 88304 TISSUE EXAM BY PATHOLOGIST: CPT | Performed by: SURGERY

## 2020-12-10 ENCOUNTER — MED REC SCAN ONLY (OUTPATIENT)
Dept: SURGERY | Facility: CLINIC | Age: 50
End: 2020-12-10

## 2020-12-17 ENCOUNTER — OFFICE VISIT (OUTPATIENT)
Dept: SURGERY | Facility: CLINIC | Age: 50
End: 2020-12-17

## 2020-12-17 VITALS
BODY MASS INDEX: 29.17 KG/M2 | DIASTOLIC BLOOD PRESSURE: 75 MMHG | WEIGHT: 208.38 LBS | SYSTOLIC BLOOD PRESSURE: 110 MMHG | TEMPERATURE: 97 F | HEIGHT: 71 IN | HEART RATE: 87 BPM

## 2020-12-17 DIAGNOSIS — K62.9 PERIANAL LESION: ICD-10-CM

## 2020-12-17 DIAGNOSIS — K64.8 INTERNAL HEMORRHOIDS WITH COMPLICATION: ICD-10-CM

## 2020-12-17 DIAGNOSIS — K62.89 RECTAL PAIN: Primary | ICD-10-CM

## 2020-12-17 PROCEDURE — 3074F SYST BP LT 130 MM HG: CPT | Performed by: PHYSICIAN ASSISTANT

## 2020-12-17 PROCEDURE — 3008F BODY MASS INDEX DOCD: CPT | Performed by: PHYSICIAN ASSISTANT

## 2020-12-17 PROCEDURE — 3078F DIAST BP <80 MM HG: CPT | Performed by: PHYSICIAN ASSISTANT

## 2020-12-17 PROCEDURE — 99024 POSTOP FOLLOW-UP VISIT: CPT | Performed by: PHYSICIAN ASSISTANT

## 2020-12-17 NOTE — PROGRESS NOTES
Post Operative Visit Note       Active Problems  1. Rectal pain    2. Internal hemorrhoids with complication    3.  Perianal lesion         Chief Complaint   Patient presents with:  Post-Op: Rectal exam under anesthesia, excision perianal lesion, rubber ban 6/29/2012   • Plantar fascial fibromatosis 6/29/2012   • PND (paroxysmal nocturnal dyspnea) 8/7/2013   • Sebaceous cyst 6/29/2012   • Testicular pain, left 2010   • Unspecified disorder of skin and subcutaneous tissue 6/29/2012   • Unspecified gastritis an Current Outpatient Medications:   •  docusate sodium (COLACE) 100 MG Oral Cap, Take 1 capsule (100 mg total) by mouth 3 (three) times daily. , Disp: 90 capsule, Rfl: 0  •  Ascorbic Acid (VITAMIN C) 1000 MG Oral Tab, Take 1,000 mg by mouth daily. , Disp: Neurological: Negative for tremors, syncope and weakness. Hematological: Negative for adenopathy. Does not bruise/bleed easily. Psychiatric/Behavioral: Negative for behavioral problems and sleep disturbance.        Physical Findings   /75 (BP Lo stool softener as needed. I have no further follow-up scheduled with this patient at this time. This patient can see me on an as-needed basis. This patient should return urgently for any problems or complications related to the surgical intervention.

## 2021-02-08 ENCOUNTER — OFFICE VISIT (OUTPATIENT)
Dept: FAMILY MEDICINE CLINIC | Facility: CLINIC | Age: 51
End: 2021-02-08
Payer: COMMERCIAL

## 2021-02-08 VITALS
BODY MASS INDEX: 28.98 KG/M2 | DIASTOLIC BLOOD PRESSURE: 86 MMHG | SYSTOLIC BLOOD PRESSURE: 110 MMHG | OXYGEN SATURATION: 98 % | HEIGHT: 71 IN | HEART RATE: 66 BPM | WEIGHT: 207 LBS | RESPIRATION RATE: 16 BRPM

## 2021-02-08 DIAGNOSIS — Z12.11 SCREENING FOR COLORECTAL CANCER: ICD-10-CM

## 2021-02-08 DIAGNOSIS — Z12.12 SCREENING FOR COLORECTAL CANCER: ICD-10-CM

## 2021-02-08 DIAGNOSIS — K92.1 BLACK STOOL: Primary | ICD-10-CM

## 2021-02-08 PROCEDURE — 3008F BODY MASS INDEX DOCD: CPT | Performed by: FAMILY MEDICINE

## 2021-02-08 PROCEDURE — 3074F SYST BP LT 130 MM HG: CPT | Performed by: FAMILY MEDICINE

## 2021-02-08 PROCEDURE — 3079F DIAST BP 80-89 MM HG: CPT | Performed by: FAMILY MEDICINE

## 2021-02-08 PROCEDURE — 99213 OFFICE O/P EST LOW 20 MIN: CPT | Performed by: FAMILY MEDICINE

## 2021-02-08 NOTE — PROGRESS NOTES
[facility] Progress Note    SUBJECTIVE: Yisel Johnson 49year old male is here today for Patient presents with: Follow - Up: needs colonscopy, Pt had one 5 years ago      Needs a colonoscopy    Noted in his chart.     Had a stomach ache and too 2012    x2   • UPPER GI ENDOSCOPY,EXAM  2006        Social Hx:  No changes    ROS    See HPI    OBJECTIVE:  /86   Pulse 66   Resp 16   Ht 5' 11\" (1.803 m)   Wt 207 lb (93.9 kg)   SpO2 98%   BMI 28.87 kg/m²       Labs:          Meds:   Current Outpat

## 2021-02-10 ENCOUNTER — LAB ENCOUNTER (OUTPATIENT)
Dept: LAB | Facility: HOSPITAL | Age: 51
End: 2021-02-10
Attending: FAMILY MEDICINE
Payer: COMMERCIAL

## 2021-02-10 DIAGNOSIS — Z12.12 SCREENING FOR COLORECTAL CANCER: ICD-10-CM

## 2021-02-10 DIAGNOSIS — K92.1 BLACK STOOL: ICD-10-CM

## 2021-02-10 DIAGNOSIS — Z12.11 SCREENING FOR COLORECTAL CANCER: ICD-10-CM

## 2021-02-10 PROCEDURE — 82274 ASSAY TEST FOR BLOOD FECAL: CPT

## 2021-02-17 LAB — HEMOCCULT STL QL: NEGATIVE

## 2021-03-12 DIAGNOSIS — Z23 NEED FOR VACCINATION: ICD-10-CM

## 2021-03-16 ENCOUNTER — IMMUNIZATION (OUTPATIENT)
Dept: LAB | Age: 51
End: 2021-03-16
Attending: HOSPITALIST
Payer: COMMERCIAL

## 2021-03-16 DIAGNOSIS — Z23 NEED FOR VACCINATION: Primary | ICD-10-CM

## 2021-03-16 PROCEDURE — 0001A SARSCOV2 VAC 30MCG/0.3ML IM: CPT

## 2021-04-11 ENCOUNTER — IMMUNIZATION (OUTPATIENT)
Dept: LAB | Age: 51
End: 2021-04-11
Attending: HOSPITALIST
Payer: COMMERCIAL

## 2021-04-11 DIAGNOSIS — Z23 NEED FOR VACCINATION: Primary | ICD-10-CM

## 2021-04-11 PROCEDURE — 0002A SARSCOV2 VAC 30MCG/0.3ML IM: CPT

## 2021-05-24 ENCOUNTER — TELEMEDICINE (OUTPATIENT)
Dept: FAMILY MEDICINE CLINIC | Facility: CLINIC | Age: 51
End: 2021-05-24
Payer: COMMERCIAL

## 2021-05-24 DIAGNOSIS — F17.290 CIGAR SMOKER: ICD-10-CM

## 2021-05-24 DIAGNOSIS — R05.9 COUGH: ICD-10-CM

## 2021-05-24 DIAGNOSIS — J02.9 SORE THROAT: ICD-10-CM

## 2021-05-24 DIAGNOSIS — M79.89 SWELLING OF RIGHT MIDDLE FINGER: Primary | ICD-10-CM

## 2021-05-24 PROCEDURE — 99214 OFFICE O/P EST MOD 30 MIN: CPT | Performed by: PHYSICIAN ASSISTANT

## 2021-05-24 NOTE — PROGRESS NOTES
Video Visit    Elmira Wrad is a 49year old male. No chief complaint on file. HPI:   Patient presents today with a few concerns. For the last 2 weeks the right middle finger has been swollen and difficult to flex. He is right-hand dominant.   No affected area 3-4 times daily 1 Tube 1   • Fluticasone Propionate 50 MCG/ACT Nasal Suspension 2 sprays by Each Nare route daily.  1 Bottle 3      Past Medical History:   Diagnosis Date   • Abdominal pain, unspecified site 6/29/2012   • Acute serous otitis m changes, eye redness, itching, or drainage. HENT: +sore throat. Denies hearing loss, ear pain, nasal congestion, sinus pain. SKIN: Denies skin lesions and rashes. RESPIRATORY: +cough.  Denies shortness of breath, wheezing  CARDIOVASCULAR: Denies chest p further. The patient indicates understanding of these issues and agrees to the plan. No follow-ups on file.       Jeremias Hector PA-C  5/24/2021  1:09 PM    Telehealth Verbal Consent   I conducted a telehealth visit with Ofelia Richardson today, 05/

## 2021-06-07 ENCOUNTER — HOSPITAL ENCOUNTER (OUTPATIENT)
Dept: GENERAL RADIOLOGY | Age: 51
Discharge: HOME | End: 2021-06-07
Attending: PHYSICIAN ASSISTANT
Payer: COMMERCIAL

## 2021-06-07 DIAGNOSIS — F17.290 CIGAR SMOKER: ICD-10-CM

## 2021-06-07 DIAGNOSIS — M79.89 SWELLING OF RIGHT MIDDLE FINGER: ICD-10-CM

## 2021-06-07 DIAGNOSIS — R05.9 COUGH: ICD-10-CM

## 2021-06-07 PROCEDURE — 71046 X-RAY EXAM CHEST 2 VIEWS: CPT | Performed by: PHYSICIAN ASSISTANT

## 2021-06-07 PROCEDURE — 73140 X-RAY EXAM OF FINGER(S): CPT | Performed by: PHYSICIAN ASSISTANT

## 2021-07-07 ENCOUNTER — LAB ENCOUNTER (OUTPATIENT)
Dept: LAB | Age: 51
End: 2021-07-07
Attending: FAMILY MEDICINE
Payer: COMMERCIAL

## 2021-07-07 ENCOUNTER — OFFICE VISIT (OUTPATIENT)
Dept: FAMILY MEDICINE CLINIC | Facility: CLINIC | Age: 51
End: 2021-07-07
Payer: COMMERCIAL

## 2021-07-07 VITALS
OXYGEN SATURATION: 97 % | DIASTOLIC BLOOD PRESSURE: 74 MMHG | HEIGHT: 71 IN | SYSTOLIC BLOOD PRESSURE: 102 MMHG | RESPIRATION RATE: 18 BRPM | HEART RATE: 75 BPM | WEIGHT: 205 LBS | BODY MASS INDEX: 28.7 KG/M2

## 2021-07-07 DIAGNOSIS — Z13.0 SCREENING FOR DEFICIENCY ANEMIA: ICD-10-CM

## 2021-07-07 DIAGNOSIS — Z12.11 COLON CANCER SCREENING: ICD-10-CM

## 2021-07-07 DIAGNOSIS — Z13.29 THYROID DISORDER SCREEN: ICD-10-CM

## 2021-07-07 DIAGNOSIS — Z13.220 LIPID SCREENING: ICD-10-CM

## 2021-07-07 DIAGNOSIS — Z12.5 PROSTATE CANCER SCREENING: ICD-10-CM

## 2021-07-07 DIAGNOSIS — E55.9 VITAMIN D DEFICIENCY: ICD-10-CM

## 2021-07-07 DIAGNOSIS — Z00.00 WELLNESS EXAMINATION: Primary | ICD-10-CM

## 2021-07-07 DIAGNOSIS — Z00.00 WELLNESS EXAMINATION: ICD-10-CM

## 2021-07-07 LAB
ALBUMIN SERPL-MCNC: 4 G/DL (ref 3.4–5)
ALBUMIN/GLOB SERPL: 1.1 {RATIO} (ref 1–2)
ALP LIVER SERPL-CCNC: 65 U/L
ALT SERPL-CCNC: 26 U/L
ANION GAP SERPL CALC-SCNC: 5 MMOL/L (ref 0–18)
AST SERPL-CCNC: 18 U/L (ref 15–37)
BASOPHILS # BLD AUTO: 0.04 X10(3) UL (ref 0–0.2)
BASOPHILS NFR BLD AUTO: 0.5 %
BILIRUB SERPL-MCNC: 0.5 MG/DL (ref 0.1–2)
BUN BLD-MCNC: 11 MG/DL (ref 7–18)
BUN/CREAT SERPL: 8.3 (ref 10–20)
CALCIUM BLD-MCNC: 8.8 MG/DL (ref 8.5–10.1)
CHLORIDE SERPL-SCNC: 109 MMOL/L (ref 98–112)
CHOLEST SMN-MCNC: 182 MG/DL (ref ?–200)
CO2 SERPL-SCNC: 25 MMOL/L (ref 21–32)
COMPLEXED PSA SERPL-MCNC: 3.83 NG/ML (ref ?–4)
CREAT BLD-MCNC: 1.32 MG/DL
DEPRECATED RDW RBC AUTO: 41.1 FL (ref 35.1–46.3)
EOSINOPHIL # BLD AUTO: 0.34 X10(3) UL (ref 0–0.7)
EOSINOPHIL NFR BLD AUTO: 4.3 %
ERYTHROCYTE [DISTWIDTH] IN BLOOD BY AUTOMATED COUNT: 12.8 % (ref 11–15)
GLOBULIN PLAS-MCNC: 3.5 G/DL (ref 2.8–4.4)
GLUCOSE BLD-MCNC: 96 MG/DL (ref 70–99)
HCT VFR BLD AUTO: 46.8 %
HDLC SERPL-MCNC: 51 MG/DL (ref 40–59)
HGB BLD-MCNC: 15.5 G/DL
IMM GRANULOCYTES # BLD AUTO: 0.02 X10(3) UL (ref 0–1)
IMM GRANULOCYTES NFR BLD: 0.3 %
LDLC SERPL CALC-MCNC: 112 MG/DL (ref ?–100)
LYMPHOCYTES # BLD AUTO: 2.31 X10(3) UL (ref 1–4)
LYMPHOCYTES NFR BLD AUTO: 28.9 %
M PROTEIN MFR SERPL ELPH: 7.5 G/DL (ref 6.4–8.2)
MCH RBC QN AUTO: 29.1 PG (ref 26–34)
MCHC RBC AUTO-ENTMCNC: 33.1 G/DL (ref 31–37)
MCV RBC AUTO: 87.8 FL
MONOCYTES # BLD AUTO: 0.49 X10(3) UL (ref 0.1–1)
MONOCYTES NFR BLD AUTO: 6.1 %
NEUTROPHILS # BLD AUTO: 4.78 X10 (3) UL (ref 1.5–7.7)
NEUTROPHILS # BLD AUTO: 4.78 X10(3) UL (ref 1.5–7.7)
NEUTROPHILS NFR BLD AUTO: 59.9 %
NONHDLC SERPL-MCNC: 131 MG/DL (ref ?–130)
OSMOLALITY SERPL CALC.SUM OF ELEC: 287 MOSM/KG (ref 275–295)
PATIENT FASTING Y/N/NP: YES
PATIENT FASTING Y/N/NP: YES
PLATELET # BLD AUTO: 275 10(3)UL (ref 150–450)
POTASSIUM SERPL-SCNC: 3.7 MMOL/L (ref 3.5–5.1)
RBC # BLD AUTO: 5.33 X10(6)UL
SODIUM SERPL-SCNC: 139 MMOL/L (ref 136–145)
TRIGL SERPL-MCNC: 106 MG/DL (ref 30–149)
TSI SER-ACNC: 1.34 MIU/ML (ref 0.36–3.74)
VIT D+METAB SERPL-MCNC: 36.6 NG/ML (ref 30–100)
VLDLC SERPL CALC-MCNC: 18 MG/DL (ref 0–30)
WBC # BLD AUTO: 8 X10(3) UL (ref 4–11)

## 2021-07-07 PROCEDURE — 99396 PREV VISIT EST AGE 40-64: CPT | Performed by: FAMILY MEDICINE

## 2021-07-07 PROCEDURE — 84153 ASSAY OF PSA TOTAL: CPT | Performed by: FAMILY MEDICINE

## 2021-07-07 PROCEDURE — 80053 COMPREHEN METABOLIC PANEL: CPT | Performed by: FAMILY MEDICINE

## 2021-07-07 PROCEDURE — 82306 VITAMIN D 25 HYDROXY: CPT | Performed by: FAMILY MEDICINE

## 2021-07-07 PROCEDURE — 3074F SYST BP LT 130 MM HG: CPT | Performed by: FAMILY MEDICINE

## 2021-07-07 PROCEDURE — 80061 LIPID PANEL: CPT | Performed by: FAMILY MEDICINE

## 2021-07-07 PROCEDURE — 3008F BODY MASS INDEX DOCD: CPT | Performed by: FAMILY MEDICINE

## 2021-07-07 PROCEDURE — 3078F DIAST BP <80 MM HG: CPT | Performed by: FAMILY MEDICINE

## 2021-07-07 NOTE — PROGRESS NOTES
HPI:   Brian Lynch is a 49year old male who presents for an Annual Health Visit. Will get a warm sensation at the bottom of his feet, and into his legs, reminds him of cramps almost, not every night. Can be painful.  Sometiems will get a cramp cyst 6/29/2012   • Testicular pain, left 2010   • Unspecified disorder of skin and subcutaneous tissue 6/29/2012   • Unspecified gastritis and gastroduodenitis without mention of hemorrhage       Past Surgical History:   Procedure Laterality Date   • COLON kg/m². General: alert, appears stated age and cooperative  Head: Normocephalic, without obvious abnormality, atraumatic  Eyes: conjunctivae/corneas clear. PERRL, EOM's intact. Fundi benign.   Ears: normal TM's and external ear canals both ears  Nose: Manav deficiency    Advised that finger is likely still healing from damage to ligament based on nature of pain    Advised on stretching exercises for cramping pain  There are no Patient Instructions on file for this visit.     The patient indicates understanding

## 2021-12-03 ENCOUNTER — LAB ENCOUNTER (OUTPATIENT)
Dept: LAB | Age: 51
End: 2021-12-03
Attending: FAMILY MEDICINE
Payer: COMMERCIAL

## 2021-12-03 ENCOUNTER — OFFICE VISIT (OUTPATIENT)
Dept: FAMILY MEDICINE CLINIC | Facility: CLINIC | Age: 51
End: 2021-12-03
Payer: COMMERCIAL

## 2021-12-03 VITALS
TEMPERATURE: 98 F | RESPIRATION RATE: 16 BRPM | OXYGEN SATURATION: 98 % | HEIGHT: 71 IN | HEART RATE: 76 BPM | BODY MASS INDEX: 28.98 KG/M2 | DIASTOLIC BLOOD PRESSURE: 86 MMHG | WEIGHT: 207 LBS | SYSTOLIC BLOOD PRESSURE: 120 MMHG

## 2021-12-03 DIAGNOSIS — R10.32 LLQ PAIN: ICD-10-CM

## 2021-12-03 DIAGNOSIS — G56.22 ULNAR NEUROPATHY OF LEFT UPPER EXTREMITY: Primary | ICD-10-CM

## 2021-12-03 DIAGNOSIS — G56.22 ULNAR NEUROPATHY OF LEFT UPPER EXTREMITY: ICD-10-CM

## 2021-12-03 PROCEDURE — 3074F SYST BP LT 130 MM HG: CPT | Performed by: FAMILY MEDICINE

## 2021-12-03 PROCEDURE — 90686 IIV4 VACC NO PRSV 0.5 ML IM: CPT | Performed by: FAMILY MEDICINE

## 2021-12-03 PROCEDURE — 3079F DIAST BP 80-89 MM HG: CPT | Performed by: FAMILY MEDICINE

## 2021-12-03 PROCEDURE — 90471 IMMUNIZATION ADMIN: CPT | Performed by: FAMILY MEDICINE

## 2021-12-03 PROCEDURE — 99213 OFFICE O/P EST LOW 20 MIN: CPT | Performed by: FAMILY MEDICINE

## 2021-12-03 PROCEDURE — 3008F BODY MASS INDEX DOCD: CPT | Performed by: FAMILY MEDICINE

## 2021-12-03 PROCEDURE — 82607 VITAMIN B-12: CPT | Performed by: FAMILY MEDICINE

## 2021-12-03 PROCEDURE — 85025 COMPLETE CBC W/AUTO DIFF WBC: CPT | Performed by: FAMILY MEDICINE

## 2021-12-03 NOTE — PROGRESS NOTES
[facility] Progress Note    SUBJECTIVE: Yisel Johnson 49year old male is here today for Patient presents with:  Numbness: left pinky x 2 months no known injury, worse at night      2 months left pinky has been numb frequently, and that side of changes    ROS  See HPI    OBJECTIVE:  /86   Pulse 76   Temp 97.9 °F (36.6 °C)   Resp 16   Ht 5' 11\" (1.803 m)   Wt 207 lb (93.9 kg)   SpO2 98%   BMI 28.87 kg/m²     Exam  Neuro: using alcohol scrub note reduction in sensation on left pinky and half

## 2022-02-23 ENCOUNTER — OFFICE VISIT (OUTPATIENT)
Dept: ELECTROPHYSIOLOGY | Facility: HOSPITAL | Age: 52
End: 2022-02-23
Attending: FAMILY MEDICINE
Payer: COMMERCIAL

## 2022-02-23 DIAGNOSIS — G56.22 ULNAR NEUROPATHY OF LEFT UPPER EXTREMITY: ICD-10-CM

## 2022-02-23 PROCEDURE — 95886 MUSC TEST DONE W/N TEST COMP: CPT | Performed by: OTHER

## 2022-02-23 PROCEDURE — 95908 NRV CNDJ TST 3-4 STUDIES: CPT | Performed by: OTHER

## 2022-02-24 NOTE — PROCEDURES
[facility], [address] [place] [address]      PATIENT'S NAME: Christian Gonzalez   REFERRING PHYSICIAN: Laine Mckinley MD   PATIENT ACCOUNT #: [de-identified] LOCATION: EMG   EDW   MEDICAL RECORD #: [id number] YOB: 1970   DATE OF EXAM: 02/23/2022       ELECTRONEUROMYOGRAPHIC REPORT    CHIEF COMPLAINT:  This patient presented with tingling, numbness sensation in the left fourth and fifth digits as well as the ulnar aspect of the left forearm last few months. EMG of left arm was requested to rule out ulnar neuropathy or cubital tunnel syndrome. INTERPRETATION:  Left ulnar sensory response was absent. Left ulnar motor distal latency was normal.  Amplitude was normal.  However, conduction velocity was significantly slowed across the elbow, borderline normal below the elbow. Ulnar F wave was not clear. Left median sensory and motor responses, as well as F wave latency, were fine. Needle examination of left first dorsal interosseous muscle and left flexor carpi ulnaris muscle revealed increased insertional activity and short runs of positive waves. Needle examination of other selected muscle in the deltoid, biceps, triceps, and left cervical C7-8 paraspinal muscle was unremarkable. IMPRESSION:  This is an abnormal study. The test results are consistent with left ulnar neuropathy across the elbow, so-called left cubital tunnel syndrome. There is no left median neuropathy or left lower cervical radiculopathy demonstrated at this time. Clinical correlation is indicated.     Dictated By Zen Trimble M.D.  d:   02/23/2022 15:09:38  t:   02/23/2022 15:47:06  Job  [id number]  KS/

## 2022-03-21 ENCOUNTER — TELEPHONE (OUTPATIENT)
Dept: FAMILY MEDICINE CLINIC | Facility: CLINIC | Age: 52
End: 2022-03-21

## 2022-03-21 DIAGNOSIS — Z12.11 COLON CANCER SCREENING: Primary | ICD-10-CM

## 2022-04-28 ENCOUNTER — LAB ENCOUNTER (OUTPATIENT)
Dept: LAB | Age: 52
End: 2022-04-28
Attending: FAMILY MEDICINE
Payer: COMMERCIAL

## 2022-05-31 ENCOUNTER — TELEMEDICINE (OUTPATIENT)
Dept: FAMILY MEDICINE CLINIC | Facility: CLINIC | Age: 52
End: 2022-05-31

## 2022-05-31 DIAGNOSIS — J22 LRTI (LOWER RESPIRATORY TRACT INFECTION): Primary | ICD-10-CM

## 2022-05-31 PROCEDURE — 99214 OFFICE O/P EST MOD 30 MIN: CPT | Performed by: FAMILY MEDICINE

## 2022-05-31 RX ORDER — DOXYCYCLINE HYCLATE 100 MG
100 TABLET ORAL 2 TIMES DAILY
Qty: 14 TABLET | Refills: 0 | Status: SHIPPED | OUTPATIENT
Start: 2022-05-31

## 2022-06-01 ENCOUNTER — HOSPITAL ENCOUNTER (OUTPATIENT)
Dept: GENERAL RADIOLOGY | Age: 52
Discharge: HOME | End: 2022-06-01
Attending: FAMILY MEDICINE
Payer: COMMERCIAL

## 2022-06-01 DIAGNOSIS — J22 LRTI (LOWER RESPIRATORY TRACT INFECTION): ICD-10-CM

## 2022-06-01 PROCEDURE — 71046 X-RAY EXAM CHEST 2 VIEWS: CPT | Performed by: FAMILY MEDICINE

## 2022-12-07 ENCOUNTER — OFFICE VISIT (OUTPATIENT)
Dept: FAMILY MEDICINE CLINIC | Facility: CLINIC | Age: 52
End: 2022-12-07
Payer: COMMERCIAL

## 2022-12-07 VITALS
WEIGHT: 209 LBS | HEIGHT: 71 IN | RESPIRATION RATE: 16 BRPM | OXYGEN SATURATION: 97 % | DIASTOLIC BLOOD PRESSURE: 84 MMHG | HEART RATE: 69 BPM | BODY MASS INDEX: 29.26 KG/M2 | SYSTOLIC BLOOD PRESSURE: 112 MMHG

## 2022-12-07 DIAGNOSIS — Z23 NEED FOR VACCINATION: ICD-10-CM

## 2022-12-07 DIAGNOSIS — Z00.00 ROUTINE GENERAL MEDICAL EXAMINATION AT A HEALTH CARE FACILITY: Primary | ICD-10-CM

## 2022-12-07 PROCEDURE — 90750 HZV VACC RECOMBINANT IM: CPT | Performed by: PHYSICIAN ASSISTANT

## 2022-12-07 PROCEDURE — 3008F BODY MASS INDEX DOCD: CPT | Performed by: PHYSICIAN ASSISTANT

## 2022-12-07 PROCEDURE — 90471 IMMUNIZATION ADMIN: CPT | Performed by: PHYSICIAN ASSISTANT

## 2022-12-07 PROCEDURE — 3079F DIAST BP 80-89 MM HG: CPT | Performed by: PHYSICIAN ASSISTANT

## 2022-12-07 PROCEDURE — 3074F SYST BP LT 130 MM HG: CPT | Performed by: PHYSICIAN ASSISTANT

## 2022-12-07 PROCEDURE — 99396 PREV VISIT EST AGE 40-64: CPT | Performed by: PHYSICIAN ASSISTANT

## 2022-12-07 PROCEDURE — 90686 IIV4 VACC NO PRSV 0.5 ML IM: CPT | Performed by: PHYSICIAN ASSISTANT

## 2022-12-07 PROCEDURE — 90472 IMMUNIZATION ADMIN EACH ADD: CPT | Performed by: PHYSICIAN ASSISTANT

## 2022-12-10 ENCOUNTER — LABORATORY ENCOUNTER (OUTPATIENT)
Dept: LAB | Age: 52
End: 2022-12-10
Attending: PHYSICIAN ASSISTANT
Payer: COMMERCIAL

## 2022-12-10 DIAGNOSIS — Z00.00 ROUTINE GENERAL MEDICAL EXAMINATION AT A HEALTH CARE FACILITY: ICD-10-CM

## 2022-12-10 LAB
ALBUMIN SERPL-MCNC: 3.7 G/DL (ref 3.4–5)
ALBUMIN/GLOB SERPL: 1.1 {RATIO} (ref 1–2)
ALP LIVER SERPL-CCNC: 67 U/L
ALT SERPL-CCNC: 21 U/L
ANION GAP SERPL CALC-SCNC: 4 MMOL/L (ref 0–18)
AST SERPL-CCNC: 11 U/L (ref 15–37)
BASOPHILS # BLD AUTO: 0.04 X10(3) UL (ref 0–0.2)
BASOPHILS NFR BLD AUTO: 0.5 %
BILIRUB SERPL-MCNC: 0.6 MG/DL (ref 0.1–2)
BUN BLD-MCNC: 12 MG/DL (ref 7–18)
CALCIUM BLD-MCNC: 9.3 MG/DL (ref 8.5–10.1)
CHLORIDE SERPL-SCNC: 106 MMOL/L (ref 98–112)
CHOLEST SERPL-MCNC: 168 MG/DL (ref ?–200)
CO2 SERPL-SCNC: 30 MMOL/L (ref 21–32)
CREAT BLD-MCNC: 1.31 MG/DL
EOSINOPHIL # BLD AUTO: 0.49 X10(3) UL (ref 0–0.7)
EOSINOPHIL NFR BLD AUTO: 6.1 %
ERYTHROCYTE [DISTWIDTH] IN BLOOD BY AUTOMATED COUNT: 12.8 %
FASTING PATIENT LIPID ANSWER: YES
FASTING STATUS PATIENT QL REPORTED: YES
GFR SERPLBLD BASED ON 1.73 SQ M-ARVRAT: 66 ML/MIN/1.73M2 (ref 60–?)
GLOBULIN PLAS-MCNC: 3.3 G/DL (ref 2.8–4.4)
GLUCOSE BLD-MCNC: 95 MG/DL (ref 70–99)
HCT VFR BLD AUTO: 44.9 %
HDLC SERPL-MCNC: 49 MG/DL (ref 40–59)
HGB BLD-MCNC: 14.7 G/DL
IMM GRANULOCYTES # BLD AUTO: 0.02 X10(3) UL (ref 0–1)
IMM GRANULOCYTES NFR BLD: 0.2 %
LDLC SERPL CALC-MCNC: 100 MG/DL (ref ?–100)
LYMPHOCYTES # BLD AUTO: 2.53 X10(3) UL (ref 1–4)
LYMPHOCYTES NFR BLD AUTO: 31.5 %
MCH RBC QN AUTO: 28.9 PG (ref 26–34)
MCHC RBC AUTO-ENTMCNC: 32.7 G/DL (ref 31–37)
MCV RBC AUTO: 88.4 FL
MONOCYTES # BLD AUTO: 0.64 X10(3) UL (ref 0.1–1)
MONOCYTES NFR BLD AUTO: 8 %
NEUTROPHILS # BLD AUTO: 4.32 X10 (3) UL (ref 1.5–7.7)
NEUTROPHILS # BLD AUTO: 4.32 X10(3) UL (ref 1.5–7.7)
NEUTROPHILS NFR BLD AUTO: 53.7 %
NONHDLC SERPL-MCNC: 119 MG/DL (ref ?–130)
OSMOLALITY SERPL CALC.SUM OF ELEC: 290 MOSM/KG (ref 275–295)
PLATELET # BLD AUTO: 267 10(3)UL (ref 150–450)
POTASSIUM SERPL-SCNC: 4.5 MMOL/L (ref 3.5–5.1)
PROT SERPL-MCNC: 7 G/DL (ref 6.4–8.2)
RBC # BLD AUTO: 5.08 X10(6)UL
SODIUM SERPL-SCNC: 140 MMOL/L (ref 136–145)
TRIGL SERPL-MCNC: 106 MG/DL (ref 30–149)
TSI SER-ACNC: 0.9 MIU/ML (ref 0.36–3.74)
VIT B12 SERPL-MCNC: 586 PG/ML (ref 193–986)
VIT D+METAB SERPL-MCNC: 18.9 NG/ML (ref 30–100)
VLDLC SERPL CALC-MCNC: 18 MG/DL (ref 0–30)
WBC # BLD AUTO: 8 X10(3) UL (ref 4–11)

## 2022-12-10 PROCEDURE — 82607 VITAMIN B-12: CPT | Performed by: PHYSICIAN ASSISTANT

## 2022-12-10 PROCEDURE — 82306 VITAMIN D 25 HYDROXY: CPT | Performed by: PHYSICIAN ASSISTANT

## 2022-12-10 PROCEDURE — 80061 LIPID PANEL: CPT | Performed by: PHYSICIAN ASSISTANT

## 2022-12-10 PROCEDURE — 80050 GENERAL HEALTH PANEL: CPT | Performed by: PHYSICIAN ASSISTANT

## 2023-02-03 ENCOUNTER — HOSPITAL ENCOUNTER (OUTPATIENT)
Dept: CT IMAGING | Age: 53
Discharge: HOME | End: 2023-02-03
Attending: FAMILY MEDICINE

## 2023-02-03 DIAGNOSIS — Z13.9 SPECIAL SCREENING: ICD-10-CM

## 2023-02-03 NOTE — PROGRESS NOTES
Date of Service 2/3/2023    Mame Handy  Date of Birth 1/17/1970    Patient Age: 47year old    PCP: Jeneen Landau, DO  [address] [address]    Consult Type  Type Scan/Screening: Heart Scan  Preliminary Heart Scan Score: 0                Body Mass Index  There is no height or weight on file to calculate BMI. Lipid Profile  Cholesterol: 168, done on 12/10/2022. HDL Cholesterol: 49, done on 12/10/2022. LDL Cholesterol: 100, done on 12/10/2022. TriGlycerides 106, done on 12/10/2022. Nurse Review  Risk factor information and results reviewed with Nurse: Yes    Recommended Follow Up:  Consult your physician regarding[de-identified]   Final Heart Scan Report; Discuss potential for Incidental Finding        Recommendations for Change:  Nutrition Changes: Low Saturated Fat;Low Fat Dairy; Increase Fiber     Cholesterol Modification (goal of therapy depends upon your risk):   Decrease LDL (Lousy/Bad) Ideal <100    Exercise: Enhance Current Program           Repeat Heart Scan: 5 years if Calcium Score is 0.0          Uvaldo Recommended Resources:  Recommended Resources: Upcoming Classes, Medical Services and [facility]. Health. Carolina Nuñez, RN        Please Contact the Nurse Heart Line with any Questions or Concerns [phone].

## 2023-03-03 ENCOUNTER — TELEPHONE (OUTPATIENT)
Dept: FAMILY MEDICINE CLINIC | Facility: CLINIC | Age: 53
End: 2023-03-03

## 2023-03-03 DIAGNOSIS — Z12.11 SCREENING FOR COLON CANCER: Primary | ICD-10-CM

## 2023-06-22 ENCOUNTER — APPOINTMENT (OUTPATIENT)
Dept: GENERAL RADIOLOGY | Age: 53
End: 2023-06-22
Attending: NURSE PRACTITIONER
Payer: COMMERCIAL

## 2023-06-22 ENCOUNTER — HOSPITAL ENCOUNTER (OUTPATIENT)
Age: 53
Discharge: HOME | End: 2023-06-22
Payer: COMMERCIAL

## 2023-06-22 ENCOUNTER — E-VISIT (OUTPATIENT)
Dept: TELEHEALTH | Age: 53
End: 2023-06-22

## 2023-06-22 VITALS
WEIGHT: 206 LBS | DIASTOLIC BLOOD PRESSURE: 82 MMHG | TEMPERATURE: 98 F | HEIGHT: 71 IN | RESPIRATION RATE: 18 BRPM | OXYGEN SATURATION: 97 % | HEART RATE: 76 BPM | SYSTOLIC BLOOD PRESSURE: 129 MMHG | BODY MASS INDEX: 28.84 KG/M2

## 2023-06-22 DIAGNOSIS — H61.21 IMPACTED CERUMEN OF RIGHT EAR: ICD-10-CM

## 2023-06-22 DIAGNOSIS — J06.9 VIRAL URI WITH COUGH: Primary | ICD-10-CM

## 2023-06-22 DIAGNOSIS — Z87.01 HISTORY OF PNEUMONIA: ICD-10-CM

## 2023-06-22 DIAGNOSIS — H92.09 OTALGIA, UNSPECIFIED LATERALITY: ICD-10-CM

## 2023-06-22 DIAGNOSIS — R05.1 ACUTE COUGH: Primary | ICD-10-CM

## 2023-06-22 PROCEDURE — 99203 OFFICE O/P NEW LOW 30 MIN: CPT

## 2023-06-22 PROCEDURE — 69210 REMOVE IMPACTED EAR WAX UNI: CPT

## 2023-06-22 PROCEDURE — 99214 OFFICE O/P EST MOD 30 MIN: CPT

## 2023-06-22 PROCEDURE — 71046 X-RAY EXAM CHEST 2 VIEWS: CPT | Performed by: NURSE PRACTITIONER

## 2023-06-22 NOTE — DISCHARGE INSTRUCTIONS
Wash hands often  Disinfect your environment, linens, electronics, etc.  Drink plenty of fluids (water, Pedialyte, etc.)  Get plenty of rest  Do not share utensils or drinks  Salt water gargles throughout the day for sore throat  Alternate Ibuprofen (adult: 600mg) and Tylenol (adult: 650-1000mg) as needed for pain / body aches / fever  You may benefit from spoonfuls of honey throughout the day for cough  You may benefit from taking a decongestant (e.g. Sudafed - pseudoephedrine [behind the pharmacy counter])  You may benefit from using a humidifier and/or steam showers  Avoid having air blow on your face as this can worsen congestion  Symptoms may take a few weeks to resolve

## 2023-07-05 LAB — SARS-COV-2 RNA RESP QL NAA+PROBE: NOT DETECTED

## 2023-10-02 ENCOUNTER — APPOINTMENT (OUTPATIENT)
Dept: LAB | Age: 53
End: 2023-10-02
Attending: FAMILY MEDICINE
Payer: COMMERCIAL

## (undated) DEVICE — DRAPE C-ARM UNIVERSAL

## (undated) DEVICE — KENDALL SCD EXPRESS SLEEVES, KNEE LENGTH, MEDIUM: Brand: KENDALL SCD

## (undated) DEVICE — ENDOPATH XCEL UNIVERSAL TROCAR STABLILITY SLEEVES: Brand: ENDOPATH XCEL

## (undated) DEVICE — ENDOPATH XCEL DILATING TIP TROCARS WITH STABILITY SLEEVES: Brand: ENDOPATH XCEL

## (undated) DEVICE — ZIPWIRE GUIDEWIRE .035X150 STR

## (undated) DEVICE — SOL  .9 1000ML BTL

## (undated) DEVICE — APPLCTR ENDO FLOSEAL DISP

## (undated) DEVICE — Device

## (undated) DEVICE — ENDOPATH XCEL WITH OPTIVIEW TECHNOLOGY BLADELESS TROCARS WITH STABILITY SLEEVES: Brand: ENDOPATH XCEL OPTIVIEW

## (undated) DEVICE — OPEN-END FLEXI-TIP URETERAL CATHETER: Brand: FLEXI-TIP

## (undated) DEVICE — MONOFILAMENT ABSORBABLE SUTURE: Brand: MAXON

## (undated) DEVICE — STERILE POLYISOPRENE POWDER-FREE SURGICAL GLOVES: Brand: PROTEXIS

## (undated) DEVICE — VISUALIZATION SYSTEM: Brand: CLEARIFY

## (undated) DEVICE — SUTURE MONOCRYL 4-0 PS-2

## (undated) DEVICE — LIGAMAX 5 MM ENDOSCOPIC MULTIPLE CLIP APPLIER: Brand: LIGAMAX

## (undated) DEVICE — LAP CHOLE/APPY CDS-LF: Brand: MEDLINE INDUSTRIES, INC.

## (undated) NOTE — MR AVS SNAPSHOT
After Visit Summary   2/23/2022    Wale Adair   MRN: [id number]           Visit Information     Date & Time  2/23/2022  3:00 PM Provider  Elaine Gaffney MD Department  [address] [place] [address] Dept. Phone  [phone]      Allergies as of 2/23/2022  Review status set to Review Complete on 12/3/2021   No Known Allergies     Your Current Medications        Dosage    acetaminophen 500 MG Oral Tab Take 500 mg by mouth every 6 (six) hours as needed for Pain. Ascorbic Acid (VITAMIN C) 1000 MG Oral Tab Take 1,000 mg by mouth daily. VITAMIN D, CHOLECALCIFEROL, OR Take by mouth. Zinc 50 MG Oral Cap Take by mouth. Hydrocort-Pramoxine, Perianal, (ANALPRAM HC) 2.5-1 % External Cream Apply 1 Application topically 3 (three) times daily as needed (RECTAL PAIN). Apply to affected area 3-4 times daily    Fluticasone Propionate 50 MCG/ACT Nasal Suspension 2 sprays by Each Nare route daily. Diagnoses for This Visit    Ulnar neuropathy of left upper extremity   [790107]             We Ordered the Following     Normal Orders This Visit    EMG [NEU5 CUSTOM]                 Did you know that [facility] primary care physicians now offer Video Visits through [address] for adult patients for a variety of conditions such as allergies, back pain and cold symptoms? Skip the drive and waiting room and online chat with a doctor face-to-face using your web-cam enabled computer or mobile device wherever you are. Video Visits cost $50 and can be paid hassle-free using a credit, debit, or health savings card. Not active on [employer]? Ask us how to get signed up today! If you receive a survey from [employer], please take a few minutes to complete it and provide feedback. We strive to deliver the best patient experience and are looking for ways to make improvements. Your feedback will help us do so. For more information on [employer], please visit [email or web address]           No text in SmartText           No text in SmartText

## (undated) NOTE — LETTER
20    Patient: Yisel Johnson  : 1970 Visit date: 2020    Dear  Reyna Rose DO    Thank you for referring Yisel Johnson to my practice. Please find my assessment and plan below.     Assessment   Bleeding internal hemorrhoids  (christina

## (undated) NOTE — MR AVS SNAPSHOT
[address]  [address] [address] [phone]               Thank you for choosing us for your health care visit with Elenita Mendiola MD.  We are glad to serve you and happy to provide you with this Or Jennifer Singh    Assoc Dx:  Lipoma of face [D17.0]          Reason for Today's Visit     Bump           Medical Issues Discussed Today     Lipoma of face      Instructions and Information about Your Health     None      Allergies as of May 03, 2017     N

## (undated) NOTE — Clinical Note
2017    Patient: Simone Kwon  : 1970 Visit date: 2017    Dear  Dr. Nataliia Hernandez DO,    Thank you for referring Simone Kwon to my practice. Please find my assessment and plan below.         Assessment   Right flank pain  (primary enco

## (undated) NOTE — MR AVS SNAPSHOT
[address]  [address] [address] [phone]               Thank you for choosing us for your health care visit with Joshua Farooq DO.   We are glad to serve you and happy to provide you with this haynes If you've recently had a stay at the Hospital you can access your discharge instructions in [employer] by going to Visits < Admission Summaries.  If you've been to the Emergency Department or your doctor's office, you can view your past visit information in My Visit [facility] online at  [email or web address]

## (undated) NOTE — Clinical Note
2017    Patient: Giovany Lynch  : 1970 Visit date: 2017    Dear  Dr. Levon Mukherjee, DO,    Thank you for referring Giovany Lynch to my practice. Please find my assessment and plan below.         Assessment   Pruritus ani  (primary encounte

## (undated) NOTE — Clinical Note
2017    Patient: Roosevelt Gore  : 1970 Visit date: 2017    Dear  Dr. Jeremy Fountain, DO,    Thank you for referring Roosevelt Gore to my practice. Please find my assessment and plan below.         Assessment   Calculus of gallbladder with ac

## (undated) NOTE — LETTER
[facility]  [address] [facility], [address] [place] [address]    Consent for Operation    Date: __________________    Time: _______________    1.  I authorize the performance upon Cynthia Herrera the following operation:    Procedure(s):  Laparoscopic [employer] procedure has been videotaped, the surgeon will obtain the original videotape. The hospital will not be responsible for storage or maintenance of this tape.     6. For the purpose of advancing medical education, I consent to the admittance of observers to t STATEMENTS REQUIRING INSERTION OR COMPLETION WERE FILLED IN.     Signature of Patient:   ___________________________    When the patient is a minor or mentally incompetent to give consent:  Signature of person authorized to consent for patient: ____________ drugs/illegal medications). Failure to inform my anesthesiologist about these medicines may increase my risk of anesthetic complications. · If I am allergic to anything or have had a reaction to anesthesia before.     3. I understand how the anesthesia med I have discussed the procedure and information above with the patient (or patient’s representative) and answered their questions. The patient or their representative has agreed to have anesthesia services.     _______________________________________________

## (undated) NOTE — Clinical Note
2017    Patient: Sanjay Ramey  : 1970 Visit date: 2017    Dear  Dr. Velasquez Earing, DO,    Thank you for referring Sanjay Ramey to my practice. Please find my assessment and plan below.         Assessment   Pruritus ani  (primary encounte

## (undated) NOTE — IP AVS SNAPSHOT
[facility] [place] [address] [facility], [address] ~ [phone]                Discharge Summary   1/2/2017    Ravi Rider           Admission Information        Provider Department    1/2/2017 Rojas Comes, DO [place] [address]         Monica He apply to affected area 3-4 times daily    Yohan Padron                        Hydrocortisone Acetate 25 MG Supp   Commonly known as:  ANUSOL-HC        Place 1 suppository (25 mg total) rectally daily.     Sravani Tha foods the patient feels are easy to digest. Avoid high fat foods in the immediate post-operative time period as this may still cause some problems.  The patient may eat anything in small amounts but foods rich in dairy products, fatty foods or fried foods m The patient may ride in a car but should not drive the car for at least one week. Patients should be off narcotics for at least 8 hours prior to being a .  The average time off work is 10 to 14 days; most adults will be seeing the surgeon prior to ret Contact information:    [address]  [phone]          Follow up with FERNANDEZ Sheppard In 2 weeks.     Specialties:  Kassandra Rollins, Physician Assistant    Why:  For wound re-check    Contact information:    [address]87 (H) (12/31/16)  2.40 (12/31/16)  0.69 (H) (12/31/16)  0.34 (H) (12/31/16)  4.81      Metabolic Lab Results  (Last result in the past 90 days)    HgbA1C Glucose BUN Creatinine Calcium Alkaline Phosph AST    -- (01/03/17)  93 (01/03/17)  10 (01/03/17) Visits < Visit Summaries. MyChart questions? Call [phone] for help. Protenushart is NOT to be used for urgent needs.   For medical emergencies, dial 911.             _____________________________________________________________________________    Dania Hodge